# Patient Record
Sex: FEMALE | HISPANIC OR LATINO | Employment: OTHER | URBAN - METROPOLITAN AREA
[De-identification: names, ages, dates, MRNs, and addresses within clinical notes are randomized per-mention and may not be internally consistent; named-entity substitution may affect disease eponyms.]

---

## 2020-11-19 ENCOUNTER — HOSPITAL ENCOUNTER (OUTPATIENT)
Facility: HOSPITAL | Age: 40
Setting detail: OBSERVATION
Discharge: HOME | End: 2020-11-20
Attending: EMERGENCY MEDICINE | Admitting: OBSTETRICS & GYNECOLOGY

## 2020-11-19 ENCOUNTER — APPOINTMENT (OUTPATIENT)
Dept: ULTRASOUND IMAGING | Facility: HOSPITAL | Age: 40
End: 2020-11-19

## 2020-11-19 DIAGNOSIS — R55 SYNCOPE, UNSPECIFIED SYNCOPE TYPE: ICD-10-CM

## 2020-11-19 DIAGNOSIS — D64.9 SEVERE ANEMIA: ICD-10-CM

## 2020-11-19 DIAGNOSIS — N92.1 MENORRHAGIA WITH IRREGULAR CYCLE: Primary | ICD-10-CM

## 2020-11-19 LAB
ABO GROUP BLD: NORMAL
ABO GROUP BLD: NORMAL
ALBUMIN SERPL-MCNC: 3.4 G/DL (ref 3.5–5.2)
ALBUMIN/GLOB SERPL: 1.5 G/DL
ALP SERPL-CCNC: 106 U/L (ref 39–117)
ALT SERPL W P-5'-P-CCNC: 40 U/L (ref 1–33)
ANION GAP SERPL CALCULATED.3IONS-SCNC: 10 MMOL/L (ref 5–15)
AST SERPL-CCNC: 34 U/L (ref 1–32)
B-HCG UR QL: NEGATIVE
BACTERIA UR QL AUTO: NORMAL /HPF
BASOPHILS # BLD AUTO: 0.05 10*3/MM3 (ref 0–0.2)
BASOPHILS NFR BLD AUTO: 0.4 % (ref 0–1.5)
BILIRUB SERPL-MCNC: <0.2 MG/DL (ref 0–1.2)
BILIRUB UR QL STRIP: NEGATIVE
BLD GP AB SCN SERPL QL: NEGATIVE
BUN SERPL-MCNC: 12 MG/DL (ref 6–20)
BUN/CREAT SERPL: 21.8 (ref 7–25)
CALCIUM SPEC-SCNC: 8.1 MG/DL (ref 8.6–10.5)
CHLORIDE SERPL-SCNC: 104 MMOL/L (ref 98–107)
CLARITY UR: CLEAR
CO2 SERPL-SCNC: 24 MMOL/L (ref 22–29)
COLOR UR: YELLOW
CREAT SERPL-MCNC: 0.55 MG/DL (ref 0.57–1)
DEPRECATED RDW RBC AUTO: 44.8 FL (ref 37–54)
EOSINOPHIL # BLD AUTO: 0.29 10*3/MM3 (ref 0–0.4)
EOSINOPHIL NFR BLD AUTO: 2.2 % (ref 0.3–6.2)
ERYTHROCYTE [DISTWIDTH] IN BLOOD BY AUTOMATED COUNT: 14.2 % (ref 12.3–15.4)
GFR SERPL CREATININE-BSD FRML MDRD: 122 ML/MIN/1.73
GFR SERPL CREATININE-BSD FRML MDRD: 148 ML/MIN/1.73
GLOBULIN UR ELPH-MCNC: 2.2 GM/DL
GLUCOSE SERPL-MCNC: 159 MG/DL (ref 65–99)
GLUCOSE UR STRIP-MCNC: NEGATIVE MG/DL
HCT VFR BLD AUTO: 22.7 % (ref 34–46.6)
HGB BLD-MCNC: 7.3 G/DL (ref 12–15.9)
HGB UR QL STRIP.AUTO: NEGATIVE
HOLD SPECIMEN: NORMAL
HOLD SPECIMEN: NORMAL
HYALINE CASTS UR QL AUTO: NORMAL /LPF
IMM GRANULOCYTES # BLD AUTO: 0.05 10*3/MM3 (ref 0–0.05)
IMM GRANULOCYTES NFR BLD AUTO: 0.4 % (ref 0–0.5)
INTERNAL NEGATIVE CONTROL: NEGATIVE
INTERNAL POSITIVE CONTROL: POSITIVE
KETONES UR QL STRIP: NEGATIVE
LEUKOCYTE ESTERASE UR QL STRIP.AUTO: ABNORMAL
LIPASE SERPL-CCNC: 24 U/L (ref 13–60)
LYMPHOCYTES # BLD AUTO: 4.19 10*3/MM3 (ref 0.7–3.1)
LYMPHOCYTES NFR BLD AUTO: 31.7 % (ref 19.6–45.3)
Lab: NORMAL
MCH RBC QN AUTO: 27.9 PG (ref 26.6–33)
MCHC RBC AUTO-ENTMCNC: 32.2 G/DL (ref 31.5–35.7)
MCV RBC AUTO: 86.6 FL (ref 79–97)
MONOCYTES # BLD AUTO: 0.76 10*3/MM3 (ref 0.1–0.9)
MONOCYTES NFR BLD AUTO: 5.7 % (ref 5–12)
NEUTROPHILS NFR BLD AUTO: 59.6 % (ref 42.7–76)
NEUTROPHILS NFR BLD AUTO: 7.89 10*3/MM3 (ref 1.7–7)
NITRITE UR QL STRIP: NEGATIVE
NRBC BLD AUTO-RTO: 0 /100 WBC (ref 0–0.2)
PH UR STRIP.AUTO: 6.5 [PH] (ref 5–8)
PLATELET # BLD AUTO: 200 10*3/MM3 (ref 140–450)
PMV BLD AUTO: 12.9 FL (ref 6–12)
POTASSIUM SERPL-SCNC: 4 MMOL/L (ref 3.5–5.2)
PROT SERPL-MCNC: 5.6 G/DL (ref 6–8.5)
PROT UR QL STRIP: NEGATIVE
RBC # BLD AUTO: 2.62 10*6/MM3 (ref 3.77–5.28)
RBC # UR: NORMAL /HPF
REF LAB TEST METHOD: NORMAL
RH BLD: POSITIVE
RH BLD: POSITIVE
SODIUM SERPL-SCNC: 138 MMOL/L (ref 136–145)
SP GR UR STRIP: 1.02 (ref 1–1.03)
SQUAMOUS #/AREA URNS HPF: NORMAL /HPF
T&S EXPIRATION DATE: NORMAL
UROBILINOGEN UR QL STRIP: ABNORMAL
WBC # BLD AUTO: 13.23 10*3/MM3 (ref 3.4–10.8)
WBC UR QL AUTO: NORMAL /HPF
WHOLE BLOOD HOLD SPECIMEN: NORMAL
WHOLE BLOOD HOLD SPECIMEN: NORMAL

## 2020-11-19 PROCEDURE — 86923 COMPATIBILITY TEST ELECTRIC: CPT

## 2020-11-19 PROCEDURE — 99285 EMERGENCY DEPT VISIT HI MDM: CPT

## 2020-11-19 PROCEDURE — P9612 CATHETERIZE FOR URINE SPEC: HCPCS

## 2020-11-19 PROCEDURE — 80053 COMPREHEN METABOLIC PANEL: CPT | Performed by: EMERGENCY MEDICINE

## 2020-11-19 PROCEDURE — 81001 URINALYSIS AUTO W/SCOPE: CPT | Performed by: EMERGENCY MEDICINE

## 2020-11-19 PROCEDURE — 86850 RBC ANTIBODY SCREEN: CPT | Performed by: EMERGENCY MEDICINE

## 2020-11-19 PROCEDURE — 86900 BLOOD TYPING SEROLOGIC ABO: CPT

## 2020-11-19 PROCEDURE — 76830 TRANSVAGINAL US NON-OB: CPT

## 2020-11-19 PROCEDURE — 86901 BLOOD TYPING SEROLOGIC RH(D): CPT | Performed by: EMERGENCY MEDICINE

## 2020-11-19 PROCEDURE — 81025 URINE PREGNANCY TEST: CPT | Performed by: EMERGENCY MEDICINE

## 2020-11-19 PROCEDURE — 81025 URINE PREGNANCY TEST: CPT

## 2020-11-19 PROCEDURE — 83690 ASSAY OF LIPASE: CPT | Performed by: EMERGENCY MEDICINE

## 2020-11-19 PROCEDURE — 85025 COMPLETE CBC W/AUTO DIFF WBC: CPT | Performed by: EMERGENCY MEDICINE

## 2020-11-19 PROCEDURE — 86900 BLOOD TYPING SEROLOGIC ABO: CPT | Performed by: EMERGENCY MEDICINE

## 2020-11-19 PROCEDURE — 86901 BLOOD TYPING SEROLOGIC RH(D): CPT

## 2020-11-19 RX ORDER — SODIUM CHLORIDE 9 MG/ML
10 INJECTION INTRAVENOUS AS NEEDED
Status: DISCONTINUED | OUTPATIENT
Start: 2020-11-19 | End: 2020-11-20 | Stop reason: HOSPADM

## 2020-11-19 RX ADMIN — SODIUM CHLORIDE 1000 ML: 9 INJECTION, SOLUTION INTRAVENOUS at 20:52

## 2020-11-19 RX ADMIN — SODIUM CHLORIDE 1000 ML: 9 INJECTION, SOLUTION INTRAVENOUS at 21:34

## 2020-11-20 VITALS
TEMPERATURE: 98.4 F | HEART RATE: 94 BPM | OXYGEN SATURATION: 100 % | SYSTOLIC BLOOD PRESSURE: 93 MMHG | RESPIRATION RATE: 16 BRPM | WEIGHT: 170 LBS | DIASTOLIC BLOOD PRESSURE: 51 MMHG | BODY MASS INDEX: 31.28 KG/M2 | HEIGHT: 62 IN

## 2020-11-20 PROBLEM — N92.0 MENORRHAGIA: Status: ACTIVE | Noted: 2020-11-20

## 2020-11-20 LAB
BASOPHILS # BLD AUTO: 0.03 10*3/MM3 (ref 0–0.2)
BASOPHILS NFR BLD AUTO: 0.3 % (ref 0–1.5)
DEPRECATED RDW RBC AUTO: 45.7 FL (ref 37–54)
EOSINOPHIL # BLD AUTO: 0.13 10*3/MM3 (ref 0–0.4)
EOSINOPHIL NFR BLD AUTO: 1.3 % (ref 0.3–6.2)
ERYTHROCYTE [DISTWIDTH] IN BLOOD BY AUTOMATED COUNT: 14 % (ref 12.3–15.4)
HCT VFR BLD AUTO: 26.1 % (ref 34–46.6)
HGB BLD-MCNC: 8.3 G/DL (ref 12–15.9)
IMM GRANULOCYTES # BLD AUTO: 0.04 10*3/MM3 (ref 0–0.05)
IMM GRANULOCYTES NFR BLD AUTO: 0.4 % (ref 0–0.5)
LYMPHOCYTES # BLD AUTO: 1.47 10*3/MM3 (ref 0.7–3.1)
LYMPHOCYTES NFR BLD AUTO: 15.2 % (ref 19.6–45.3)
MCH RBC QN AUTO: 28.5 PG (ref 26.6–33)
MCHC RBC AUTO-ENTMCNC: 31.8 G/DL (ref 31.5–35.7)
MCV RBC AUTO: 89.7 FL (ref 79–97)
MONOCYTES # BLD AUTO: 0.52 10*3/MM3 (ref 0.1–0.9)
MONOCYTES NFR BLD AUTO: 5.4 % (ref 5–12)
NEUTROPHILS NFR BLD AUTO: 7.5 10*3/MM3 (ref 1.7–7)
NEUTROPHILS NFR BLD AUTO: 77.4 % (ref 42.7–76)
NRBC BLD AUTO-RTO: 0 /100 WBC (ref 0–0.2)
PLATELET # BLD AUTO: 124 10*3/MM3 (ref 140–450)
PMV BLD AUTO: 11.9 FL (ref 6–12)
RBC # BLD AUTO: 2.91 10*6/MM3 (ref 3.77–5.28)
SARS-COV-2 RNA RESP QL NAA+PROBE: NOT DETECTED
WBC # BLD AUTO: 9.69 10*3/MM3 (ref 3.4–10.8)

## 2020-11-20 PROCEDURE — 96376 TX/PRO/DX INJ SAME DRUG ADON: CPT

## 2020-11-20 PROCEDURE — 85025 COMPLETE CBC W/AUTO DIFF WBC: CPT | Performed by: OBSTETRICS & GYNECOLOGY

## 2020-11-20 PROCEDURE — 96374 THER/PROPH/DIAG INJ IV PUSH: CPT

## 2020-11-20 PROCEDURE — 25010000002 ESTROGENS CONJUGATED PER 25 MG: Performed by: OBSTETRICS & GYNECOLOGY

## 2020-11-20 PROCEDURE — G0378 HOSPITAL OBSERVATION PER HR: HCPCS

## 2020-11-20 PROCEDURE — 87635 SARS-COV-2 COVID-19 AMP PRB: CPT | Performed by: OBSTETRICS & GYNECOLOGY

## 2020-11-20 PROCEDURE — 36430 TRANSFUSION BLD/BLD COMPNT: CPT

## 2020-11-20 PROCEDURE — C9803 HOPD COVID-19 SPEC COLLECT: HCPCS | Performed by: OBSTETRICS & GYNECOLOGY

## 2020-11-20 PROCEDURE — P9016 RBC LEUKOCYTES REDUCED: HCPCS

## 2020-11-20 PROCEDURE — 99218 PR INITIAL OBSERVATION CARE/DAY 30 MINUTES: CPT | Performed by: OBSTETRICS & GYNECOLOGY

## 2020-11-20 PROCEDURE — 25010000002 PROMETHAZINE PER 50 MG: Performed by: OBSTETRICS & GYNECOLOGY

## 2020-11-20 PROCEDURE — 86900 BLOOD TYPING SEROLOGIC ABO: CPT

## 2020-11-20 RX ORDER — NORGESTIMATE AND ETHINYL ESTRADIOL 0.25-0.035
1 KIT ORAL DAILY
Qty: 28 TABLET | Refills: 3 | Status: SHIPPED | OUTPATIENT
Start: 2020-11-20 | End: 2020-11-21 | Stop reason: SDUPTHER

## 2020-11-20 RX ADMIN — PROMETHAZINE HYDROCHLORIDE 12.5 MG: 25 INJECTION INTRAMUSCULAR; INTRAVENOUS at 10:49

## 2020-11-20 RX ADMIN — CONJUGATED ESTROGENS 25 MG: 25 INJECTION, POWDER, LYOPHILIZED, FOR SOLUTION INTRAMUSCULAR; INTRAVENOUS at 06:22

## 2020-11-20 RX ADMIN — CONJUGATED ESTROGENS 25 MG: 25 INJECTION, POWDER, LYOPHILIZED, FOR SOLUTION INTRAMUSCULAR; INTRAVENOUS at 00:27

## 2020-11-20 NOTE — PLAN OF CARE
Problem: Adult Inpatient Plan of Care  Goal: Absence of Hospital-Acquired Illness or Injury  Intervention: Identify and Manage Fall Risk  Recent Flowsheet Documentation  Taken 11/20/2020 0400 by Morris Brar RN  Safety Promotion/Fall Prevention:   activity supervised   assistive device/personal items within reach   clutter free environment maintained   fall prevention program maintained   gait belt   nonskid shoes/slippers when out of bed   room organization consistent  Taken 11/20/2020 0200 by Morris Brar RN  Safety Promotion/Fall Prevention:   activity supervised   assistive device/personal items within reach   clutter free environment maintained   fall prevention program maintained   gait belt   mobility aid in reach   room organization consistent   safety round/check completed  Taken 11/20/2020 0019 by Morris Brar RN  Safety Promotion/Fall Prevention:   activity supervised   assistive device/personal items within reach   clutter free environment maintained   fall prevention program maintained   nonskid shoes/slippers when out of bed   mobility aid in reach   safety round/check completed   room organization consistent  Intervention: Prevent Skin Injury  Recent Flowsheet Documentation  Taken 11/20/2020 0400 by Morris Brar RN  Body Position: position changed independently  Taken 11/20/2020 0200 by Morris Brar RN  Body Position: supine  Taken 11/20/2020 0019 by Morris Brar RN  Body Position:   position changed independently   neutral body alignment  Intervention: Prevent Infection  Recent Flowsheet Documentation  Taken 11/20/2020 0400 by Morris Brar RN  Infection Prevention:   personal protective equipment utilized   rest/sleep promoted  Taken 11/20/2020 0200 by Morris Brar RN  Infection Prevention:   personal protective equipment utilized   rest/sleep promoted  Taken 11/20/2020 0019 by Morris Brar RN  Infection Prevention:   personal protective equipment utilized   rest/sleep  promoted  Goal: Optimal Comfort and Wellbeing  Intervention: Provide Person-Centered Care  Recent Flowsheet Documentation  Taken 11/20/2020 0019 by Morris Brar, RN  Trust Relationship/Rapport:   care explained   choices provided   emotional support provided   empathic listening provided   questions answered   questions encouraged   reassurance provided   thoughts/feelings acknowledged  Goal: Readiness for Transition of Care  Intervention: Mutually Develop Transition Plan  Recent Flowsheet Documentation  Taken 11/20/2020 0019 by Morris Brar, RN  Equipment Currently Used at Home: none   Goal Outcome Evaluation:

## 2020-11-20 NOTE — DISCHARGE SUMMARY
11/20/2020    Name:Luz Maria Narvaez   MR#:[id number]      GYN Progress Note       HD:0    Subjective   40 y.o.yo No obstetric history on file. Her bleeding has completely stopped after 2 doses IV premarin. She cont to be anemic after the 2 units, but she is asymptomatic from this. She desires to go home.     Patient Active Problem List    Diagnosis   • Menorrhagia [N92.0]       Objective     Vital Signs Range for the last 24 hours  Temp: Temp:  [97.7 °F (36.5 °C)-98.4 °F (36.9 °C)] 98.4 °F (36.9 °C) Temp src: Oral  BP: BP: ()/(33-68) 93/51   BP Readings from Last 3 Encounters:   11/20/20 93/51     Pulse: Heart Rate:  [] 94   Pulse Readings from Last 3 Encounters:   11/20/20 94     Resp:  Resp:  [16-20] 16    I/O last 3 completed shifts:  In: 1680 [Blood:680; IV Piggyback:1000]  Out: -   I/O this shift:  In: 356 [Blood:356]  Out: -       Results from last 7 days   Lab Units 11/20/20  1030 11/19/20  2045   WBC 10*3/mm3 9.69 13.23*   HEMOGLOBIN g/dL 8.3* 7.3*   HEMATOCRIT % 26.1* 22.7*   PLATELETS 10*3/mm3 124* 200     Results from last 7 days   Lab Units 11/19/20  2045   SODIUM mmol/L 138   POTASSIUM mmol/L 4.0   CHLORIDE mmol/L 104   CO2 mmol/L 24.0   BUN mg/dL 12   CREATININE mg/dL 0.55*   CALCIUM mg/dL 8.1*   BILIRUBIN mg/dL <0.2   ALK PHOS U/L 106   ALT (SGPT) U/L 40*   AST (SGOT) U/L 34*   GLUCOSE mg/dL 159*     Results from last 7 days   Lab Units 11/19/20 2045   SODIUM mmol/L 138   POTASSIUM mmol/L 4.0   CHLORIDE mmol/L 104   CO2 mmol/L 24.0   BUN mg/dL 12   CREATININE mg/dL 0.55*   GLUCOSE mg/dL 159*   CALCIUM mg/dL 8.1*         Physical Exam:  General Appearance:  awake, alert, oriented, in no acute distress  Head/face:  NCAT  Lungs:  Normal expansion.  Clear to auscultation.  No rales, rhonchi, or wheezing.  Heart:  Heart sounds are normal.  Regular rate and rhythm without murmur, gallop or rub.  Abdomen:  Skin: clean, dry, intact     Extremities: NTTP, no edema, SCDS  bilaterally      Assessment/Plan   1.  AUB, menorrhagia. Bleeding now stopped, patient stable. Will DC home on OCPs for maintenance and FU in office in 1-2 wks. rec iron po. Will need endometrial biopsy as outpatient.         Charline Betancourt MD  11/20/2020 12:23 EST

## 2020-11-20 NOTE — H&P
"[facility]  Luz Maria Narvaez  : 1980  MRN: [id number]  CSN: [id number]    CC: vaginal bleeding                    Subjective   Luz Maria Narvaez is a 40 y.o. year old No obstetric history on file. who presented to the ED with heavy acute vaginal bleeding.  She reports she started bleeding night before last, and it continued through the day until she started feeling dizzy, and had a pre syncopal episode.     In ED, her hct was 22% and she was orthostatic, they started 2u prbc transfusion. A TVUS was essentially normal, hcg neg.     She has a history of 1 sab, and 4 SVDs without complication. Reports no previous GYN history. She does say her periods are irregular,but until this episode have not been heavy.     No past medical history on file.  Past Surgical History:   Procedure Laterality Date   • ABDOMINAL SURGERY      C section      OB History   No obstetric history on file.     Social History    Tobacco Use      Smoking status: Never Smoker      Smokeless tobacco: Never Used      Current Facility-Administered Medications:   •  promethazine (PHENERGAN) IVPB 12.5 mg, 12.5 mg, Intravenous, Q6H PRN, Charline Betancourt MD  •  Sodium Chloride (PF) 0.9 % 10 mL, 10 mL, Intravenous, PRN, Lj Patterson MD    No Known Allergies    Review of Systems   HENT: Negative.    Respiratory: Negative.    Gastrointestinal: Negative.    Endocrine: Negative.    Genitourinary: Positive for menstrual problem and vaginal bleeding.   Allergic/Immunologic: Negative.    Neurological: Positive for dizziness, weakness and light-headedness.   Hematological: Negative.          Objective   BP (!) 90/35   Pulse 89   Temp 98.2 °F (36.8 °C)   Resp 16   Ht 157.5 cm (62\")   Wt 77.1 kg (170 lb)   LMP 2020   SpO2 100%   BMI 31.09 kg/m²   General: well developed; well nourished  no acute distress   Heart: regular rate and rhythm, S1, S2 normal, no murmur, click, rub or gallop   Lungs: breathing is " unlabored  clear to auscultation bilaterally   Abdomen: soft, non-tender; no masses  no umbilical or inguinal hernias are present  no hepato-splenomegaly   Pelvis: External genitalia:  normal appearance of the external genitalia including Bartholin's and [place]'s glands.   Labs  Amylase & Lipase:   Lab Results   Component Value Date    LIPASE 24 11/19/2020     BMP:   Lab Results   Component Value Date     11/19/2020    K 4.0 11/19/2020     11/19/2020    CO2 24.0 11/19/2020    BUN 12 11/19/2020    CREATININE 0.55 (L) 11/19/2020     CBC:   Lab Results   Component Value Date     11/19/2020    HGB 7.3 (L) 11/19/2020    HCT 22.7 (L) 11/19/2020    WBC 13.23 (H) 11/19/2020     CBC w/ diff:   Lab Results   Component Value Date     11/19/2020    HGB 7.3 (L) 11/19/2020    HCT 22.7 (L) 11/19/2020    MCV 86.6 11/19/2020    RDW 14.2 11/19/2020    WBC 13.23 (H) 11/19/2020    NEUTRORELPCT 59.6 11/19/2020    AUTOIGPER 0.4 11/19/2020    LYMPHORELPCT 31.7 11/19/2020    MONORELPCT 5.7 11/19/2020    EOSRELPCT 2.2 11/19/2020    BASORELPCT 0.4 11/19/2020     HCG: No results found for: HCGQUANT    Imaging Reviewed  TVUS, images reviewed and agree with assesment        Assessment   1. Menorrhagia, AUB with symptomatic anemia admitted through ED. REeived 2 u prbc, and 2 doses of IV premarin; Her bleeding is now stopped and she feels well. She denies any dizziness when up and moving this morning.      Plan   1. will plan on advancing diet, repeating hct,and monitoring through the morning. If her bleeding remains stable, will plan on DC home this afternoon. She will need close FU in office. Will need endometrial biopsy and plan for long term management of bleeding. Encouraged PO iron. Will likely DC home on PO premarin.         Charline Betancourt MD  11/20/2020  09:51 EST

## 2020-11-21 LAB
BH BB BLOOD EXPIRATION DATE: NORMAL
BH BB BLOOD EXPIRATION DATE: NORMAL
BH BB BLOOD TYPE BARCODE: 5100
BH BB BLOOD TYPE BARCODE: 5100
BH BB DISPENSE STATUS: NORMAL
BH BB DISPENSE STATUS: NORMAL
BH BB PRODUCT CODE: NORMAL
BH BB PRODUCT CODE: NORMAL
BH BB UNIT NUMBER: NORMAL
BH BB UNIT NUMBER: NORMAL
CROSSMATCH INTERPRETATION: NORMAL
CROSSMATCH INTERPRETATION: NORMAL
UNIT  ABO: NORMAL
UNIT  ABO: NORMAL
UNIT  RH: NORMAL
UNIT  RH: NORMAL

## 2020-11-21 RX ORDER — NORGESTIMATE AND ETHINYL ESTRADIOL 0.25-0.035
1 KIT ORAL DAILY
Qty: 28 TABLET | Refills: 3 | Status: SHIPPED | OUTPATIENT
Start: 2020-11-21 | End: 2020-12-15

## 2020-11-22 NOTE — ED PROVIDER NOTES
EMERGENCY DEPARTMENT ENCOUNTER    Room Number:  S384/1  Date of encounter:  11/19/20  PCP: Provider, No Known  Historian: Patient      HPI:  Chief Complaint: Heavy vaginal bleeding, recurrent syncope        Context: Luz Maria Narvaez is a 40 y.o. female who presents to the ED c/o heavy vaginal bleeding which started roughly 24 hours ago.  The patient reports passing large clots and having heavy menstrual-like flow which is only now decreasing slightly.  She reports severe dizziness/lightheadedness in the upright position.  She is suffered recurrent syncopal episodes but recovers quickly when placed in supine position.  She denies pain.  No anticoagulation.  No prior heavy menstrual flow previously.  No recent trauma or illness.  No hormone therapy.      PAST MEDICAL HISTORY  Active Ambulatory Problems     Diagnosis Date Noted   • No Active Ambulatory Problems     Resolved Ambulatory Problems     Diagnosis Date Noted   • No Resolved Ambulatory Problems     No Additional Past Medical History         PAST SURGICAL HISTORY  Past Surgical History:   Procedure Laterality Date   • ABDOMINAL SURGERY  1993    C section          FAMILY HISTORY  No family history on file.      SOCIAL HISTORY  Social History     Socioeconomic History   • Marital status: Single     Spouse name: Not on file   • Number of children: Not on file   • Years of education: Not on file   • Highest education level: Not on file   Tobacco Use   • Smoking status: Never Smoker   • Smokeless tobacco: Never Used   Substance and Sexual Activity   • Alcohol use: Never     Frequency: Never   • Drug use: Never   • Sexual activity: Yes     Birth control/protection: Condom         ALLERGIES  Patient has no known allergies.        REVIEW OF SYSTEMS  Review of Systems     All systems reviewed and negative except for those discussed in HPI.       PHYSICAL EXAM    I have reviewed the triage vital signs and nursing notes.    ED Triage Vitals   Temp Heart Rate  Resp BP SpO2   11/19/20 2033 11/19/20 2029 11/19/20 2029 11/19/20 2029 11/19/20 2029   97.9 °F (36.6 °C) 84 20 105/55 100 %      Temp src Heart Rate Source Patient Position BP Location FiO2 (%)   11/19/20 2033 11/20/20 0035 11/19/20 2034 11/20/20 0035 --   Oral Monitor (S) Lying Right arm        Physical Exam  GENERAL:   Appears pale and weak.  She is hypotensive..  HENT: Nares patent.  Dry mucous membranes.  EYES: No scleral icterus.  CV: Regular rhythm, tachycardic rate.  2+ radial pulses once blood pressure rises above 90.  RESPIRATORY: Normal effort.  No audible wheezes, rales or rhonchi.  Clear to auscultation.  ABDOMEN: Soft, nontender to deep palpation.  No masses.  MUSCULOSKELETAL: No deformities.   NEURO: Alert, moves all extremities, follows commands.   SKIN: Warm, dry, no rash visualized.  Pale.        LAB RESULTS  No results found for this or any previous visit (from the past 24 hour(s)).    If labs were ordered, I independently reviewed the results.        RADIOLOGY  No Radiology Exams Resulted Within Past 24 Hours    I reviewed the ultrasound images/radiologist interpretation.  See radiologist's dictation for official interpretation.        PROCEDURES    Critical Care  Performed by: Lj Patterson MD  Authorized by: Lj Patterson MD     Critical care provider statement:     Critical care time (minutes):  35    Critical care time was exclusive of:  Separately billable procedures and treating other patients    Critical care was necessary to treat or prevent imminent or life-threatening deterioration of the following conditions:  Circulatory failure    Critical care was time spent personally by me on the following activities:  Ordering and performing treatments and interventions, ordering and review of laboratory studies, ordering and review of radiographic studies, pulse oximetry, re-evaluation of patient's condition, review of old charts, obtaining history from patient or surrogate, examination of  patient, evaluation of patient's response to treatment, discussions with consultants and development of treatment plan with patient or surrogate        No orders to display       MEDICATIONS GIVEN IN ER    Medications   sodium chloride 0.9 % bolus 1,000 mL (0 mL Intravenous Stopped 11/19/20 2134)   sodium chloride 0.9 % bolus 1,000 mL (1,000 mL Intravenous New Bag 11/19/20 2134)   conjugated estrogens (PREMARIN) injection 25 mg (25 mg Intravenous Given 11/20/20 0622)         PROGRESS, DATA ANALYSIS, CONSULTS, AND MEDICAL DECISION MAKING    All labs have been independently reviewed by me.  All radiology studies have been reviewed by me and the radiologist dictating the report.   EKG's have been independently viewed and interpreted by me.            ED Course as of Nov 22 1309   Thu Nov 19, 2020   6420 I spoke with , on-call for OB/GYN.  She agrees with the current plan of 2 units of packed red blood cell transfusion.  She will admit to telemetry.  I have ordered transvaginal ultrasound under her request.    [MS]      ED Course User Index  [MS] Lj Patterson MD             AS OF 13:09 EST VITALS:    BP - 93/51  HR - 94  TEMP - 98.4 °F (36.9 °C) (Oral)  O2 SATS - 100%        DIAGNOSIS  Final diagnoses:   Menorrhagia with irregular cycle   Syncope, unspecified syncope type   Severe anemia         DISPOSITION  Admission           Lj Patterson MD  11/22/20 1311

## 2020-12-08 ENCOUNTER — PROCEDURE VISIT (OUTPATIENT)
Dept: OBSTETRICS AND GYNECOLOGY | Facility: CLINIC | Age: 40
End: 2020-12-08

## 2020-12-08 VITALS
DIASTOLIC BLOOD PRESSURE: 60 MMHG | SYSTOLIC BLOOD PRESSURE: 100 MMHG | WEIGHT: 170 LBS | BODY MASS INDEX: 31.28 KG/M2 | HEIGHT: 62 IN

## 2020-12-08 DIAGNOSIS — N93.9 ABNORMAL UTERINE BLEEDING (AUB): Primary | ICD-10-CM

## 2020-12-08 PROCEDURE — 58100 BIOPSY OF UTERUS LINING: CPT | Performed by: NURSE PRACTITIONER

## 2020-12-08 NOTE — PROGRESS NOTES
Endometrial Biopsy    Date/Time: 2020 5:45 PM  Performed by: Lina Hale APRN  Authorized by: Charline Betancourt MD   Local anesthesia used: no    Anesthesia:  Local anesthesia used: no    Sedation:  Patient sedated: no    Patient tolerance: patient tolerated the procedure well with no immediate complications  Comments: Endometrial Biopsy Procedure Note  Procedures     Indications: Luz Maria Narvaez is a 40 y.o. , who presents today for endometrial biopsy.  The patient was noted to have AUB.  Her LMP was 2020.   After being presented with the risk, benefits, and specific detail of the procedure, the patient wished to proceed.  Written consent was obtained from patient.   Urine pregnancy test was negative.      Procedure Details   The patient was placed on the table in the dorsal lithotomy position.  She was draped in the appropriate manner.  A speculum was placed in the vagina.  The cervix was visualized and prepped with Betadine.  A mass is noted at cervical os 2-3 cm in diameter.  A tenaculum was placed on the anterior lip of the cervix for traction.  A small plastic 5 mm Pipelle syringe curette was inserted into the cervical canal.  A vigorous four quadrant biopsy was performed, removing a medium amount of tissue.  The tissue was placed in formalin and sent to pathology.  The patient tolerated the procedure well and she reported mild cramping.  The tenaculum was removed from the cervix and the speculum was removed.  The patient was observed for 10 minutes.           Complications: none.     I also did a Pap and a biopsy of the cervical mass.  Punch biopsy used.       She denies sob, dizziness.  CBC, TFTs done.

## 2020-12-09 LAB
ERYTHROCYTE [DISTWIDTH] IN BLOOD BY AUTOMATED COUNT: 15.7 % (ref 11.7–15.4)
HCT VFR BLD AUTO: 24.4 % (ref 34–46.6)
HGB BLD-MCNC: 7.9 G/DL (ref 11.1–15.9)
Lab: NORMAL
Lab: NORMAL
MCH RBC QN AUTO: 26.2 PG (ref 26.6–33)
MCHC RBC AUTO-ENTMCNC: 32.4 G/DL (ref 31.5–35.7)
MCV RBC AUTO: 81 FL (ref 79–97)
PLATELET # BLD AUTO: 311 X10E3/UL (ref 150–450)
RBC # BLD AUTO: 3.02 X10E6/UL (ref 3.77–5.28)
TSH SERPL DL<=0.005 MIU/L-ACNC: 2.44 UIU/ML (ref 0.45–4.5)
WBC # BLD AUTO: 6.6 X10E3/UL (ref 3.4–10.8)

## 2020-12-11 ENCOUNTER — TELEPHONE (OUTPATIENT)
Dept: OBSTETRICS AND GYNECOLOGY | Facility: CLINIC | Age: 40
End: 2020-12-11

## 2020-12-11 NOTE — TELEPHONE ENCOUNTER
[facility] yesterday and 2d ago to review results with pt's daughter who translates.      I called back today and pt's daughter is unavailable.  Pt is going to have daughter call back today.

## 2020-12-15 ENCOUNTER — OFFICE VISIT (OUTPATIENT)
Dept: OBSTETRICS AND GYNECOLOGY | Facility: CLINIC | Age: 40
End: 2020-12-15

## 2020-12-15 VITALS
HEIGHT: 62 IN | BODY MASS INDEX: 34.41 KG/M2 | DIASTOLIC BLOOD PRESSURE: 72 MMHG | WEIGHT: 187 LBS | SYSTOLIC BLOOD PRESSURE: 136 MMHG

## 2020-12-15 DIAGNOSIS — D25.0 SUBMUCOUS LEIOMYOMA OF UTERUS: ICD-10-CM

## 2020-12-15 DIAGNOSIS — N93.9 ABNORMAL UTERINE BLEEDING (AUB): Primary | ICD-10-CM

## 2020-12-15 DIAGNOSIS — N93.9 ABNORMAL UTERINE BLEEDING (AUB): ICD-10-CM

## 2020-12-15 PROCEDURE — 99213 OFFICE O/P EST LOW 20 MIN: CPT | Performed by: OBSTETRICS & GYNECOLOGY

## 2020-12-15 RX ORDER — MEDROXYPROGESTERONE ACETATE 10 MG/1
10 TABLET ORAL DAILY
Qty: 20 TABLET | Refills: 0 | Status: SHIPPED | OUTPATIENT
Start: 2020-12-15

## 2020-12-15 NOTE — PROGRESS NOTES
"Chief Complaint   Patient presents with   • Follow-up     TSH normal - HCT 24.4    Subjective   HPI  Luz Maria Narvaez is a 40 y.o. female, , who presents for follow up for evaluation of Abnormal Uterine Bleeding and Menorrhagia.      She states she has experienced this problem for 1 duration: month.  She describes the severity as severe.  She states that the problem is worsening.  The patient uses 1 of tampons/pads per hour.  The patient reports additional symptoms as dizziness, insomnia.  The patient has been evaluated: 2020 biopsy results are back.  In the past the patient has tried none.    Endometrial biopsy benign, there cervical mass/lesion biopsy shows it be a prolapsing uterine fibroid.         Her last LMP was Patient's last menstrual period was 2020.  Periods are 3 months, lasting 5 days.  Partner Status: Marital Status: single.     Additional OB/GYN History   Last Pap : 2020 - non dx due to blood  Last Completed Pap Smear       Status Date      PAP SMEAR Done 2018 neg per pt        History of abnormal Pap smear: no  Tobacco Usage?: No     The additional following portions of the patient's history were reviewed and updated as appropriate: current medications, past family history, past medical history, past social history, past surgical history and problem list.    Review of Systems    I have reviewed and agree with the HPI, ROS, and historical information as entered above. Charline Betancourt MD    Objective   /72   Ht 157.5 cm (62\")   Wt 84.8 kg (187 lb)   LMP 2020   BMI 34.20 kg/m²     Physical Exam  Vitals signs and nursing note reviewed. Exam conducted with a chaperone present.   Constitutional:       Appearance: Normal appearance. She is well-developed.   HENT:      Head: Normocephalic and atraumatic.      Nose: Nose normal. No congestion or rhinorrhea.   Eyes:      General: No scleral icterus.     Pupils: Pupils are equal, round, and reactive to light. "   Neck:      Musculoskeletal: Normal range of motion and neck supple.   Pulmonary:      Effort: Pulmonary effort is normal.      Breath sounds: Normal breath sounds.   Abdominal:      General: There is no distension.      Palpations: Abdomen is soft.      Tenderness: There is no abdominal tenderness. There is no guarding or rebound.   Genitourinary:     General: Normal vulva.      Exam position: Lithotomy position.      Labia:         Right: No rash or lesion.         Left: No rash or lesion.       Urethra: No prolapse.      Vagina: Normal.      Cervix: Lesion present. No cervical motion tenderness, discharge or cervical bleeding.      Uterus: Normal. Not tender.       Adnexa: Right adnexa normal and left adnexa normal.        Right: No tenderness.          Left: No tenderness.        Rectum: Normal.   Musculoskeletal: Normal range of motion.         General: No swelling.   Skin:     General: Skin is warm and dry.   Neurological:      General: No focal deficit present.      Mental Status: She is alert and oriented to person, place, and time.   Psychiatric:         Mood and Affect: Mood normal.         Behavior: Behavior normal. Behavior is cooperative.     2cm prolapsing fibroid seen at ext os    Assessment/Plan     Assessment     Problem List Items Addressed This Visit     None      Visit Diagnoses     Abnormal uterine bleeding (AUB)    -  Primary    Relevant Orders    CBC (No Diff)    HCG, B-subunit, Quantitative    Submucous leiomyoma of uterus        Relevant Orders    CBC (No Diff)    HCG, B-subunit, Quantitative            Plan     1. Discussed options with  services and discussed her fibroid and options for management. Plan will be to go to outpt surgery day after tomorrow and do hysteroscopy and removal of fibroid, and place mirena at same time. They arent using any other forms of contraception at this time. Will repeat cbc today to make sure doesn't need additional transfusion.       Charline Betancourt,  MD  12/15/2020

## 2020-12-16 LAB
ERYTHROCYTE [DISTWIDTH] IN BLOOD BY AUTOMATED COUNT: 16.1 % (ref 11.7–15.4)
HCG INTACT+B SERPL-ACNC: <1 MIU/ML
HCT VFR BLD AUTO: 25.7 % (ref 34–46.6)
HGB BLD-MCNC: 8 G/DL (ref 11.1–15.9)
MCH RBC QN AUTO: 24.6 PG (ref 26.6–33)
MCHC RBC AUTO-ENTMCNC: 31.1 G/DL (ref 31.5–35.7)
MCV RBC AUTO: 79 FL (ref 79–97)
PLATELET # BLD AUTO: 311 X10E3/UL (ref 150–450)
RBC # BLD AUTO: 3.25 X10E6/UL (ref 3.77–5.28)
WBC # BLD AUTO: 8.7 X10E3/UL (ref 3.4–10.8)

## 2020-12-17 ENCOUNTER — APPOINTMENT (OUTPATIENT)
Dept: PREADMISSION TESTING | Facility: HOSPITAL | Age: 40
End: 2020-12-17

## 2020-12-17 LAB — SARS-COV-2 RNA RESP QL NAA+PROBE: NOT DETECTED

## 2020-12-17 PROCEDURE — C9803 HOPD COVID-19 SPEC COLLECT: HCPCS

## 2020-12-17 PROCEDURE — U0004 COV-19 TEST NON-CDC HGH THRU: HCPCS

## 2020-12-18 ENCOUNTER — LAB REQUISITION (OUTPATIENT)
Dept: LAB | Facility: HOSPITAL | Age: 40
End: 2020-12-18

## 2020-12-18 ENCOUNTER — OUTSIDE FACILITY SERVICE (OUTPATIENT)
Dept: OBSTETRICS AND GYNECOLOGY | Facility: CLINIC | Age: 40
End: 2020-12-18

## 2020-12-18 DIAGNOSIS — N93.9 ABNORMAL UTERINE AND VAGINAL BLEEDING, UNSPECIFIED: ICD-10-CM

## 2020-12-18 DIAGNOSIS — N93.9 ABNORMAL UTERINE BLEEDING (AUB): ICD-10-CM

## 2020-12-18 DIAGNOSIS — D25.9 UTERINE LEIOMYOMA, UNSPECIFIED LOCATION: Primary | ICD-10-CM

## 2020-12-18 PROCEDURE — 88305 TISSUE EXAM BY PATHOLOGIST: CPT | Performed by: OBSTETRICS & GYNECOLOGY

## 2020-12-18 PROCEDURE — 58300 INSERT INTRAUTERINE DEVICE: CPT | Performed by: OBSTETRICS & GYNECOLOGY

## 2020-12-18 PROCEDURE — 58561 HYSTEROSCOPY REMOVE MYOMA: CPT | Performed by: OBSTETRICS & GYNECOLOGY

## 2020-12-21 LAB
CYTO UR: NORMAL
LAB AP CASE REPORT: NORMAL
LAB AP CLINICAL INFORMATION: NORMAL
PATH REPORT.FINAL DX SPEC: NORMAL
PATH REPORT.GROSS SPEC: NORMAL